# Patient Record
Sex: FEMALE | Race: WHITE
[De-identification: names, ages, dates, MRNs, and addresses within clinical notes are randomized per-mention and may not be internally consistent; named-entity substitution may affect disease eponyms.]

---

## 2016-08-03 VITALS
DIASTOLIC BLOOD PRESSURE: 77 MMHG | SYSTOLIC BLOOD PRESSURE: 129 MMHG | DIASTOLIC BLOOD PRESSURE: 77 MMHG | HEART RATE: 68 BPM | HEART RATE: 68 BPM | SYSTOLIC BLOOD PRESSURE: 129 MMHG

## 2017-08-01 ENCOUNTER — HOSPITAL ENCOUNTER (OUTPATIENT)
Dept: HOSPITAL 45 - C.ONC | Age: 72
Discharge: HOME | End: 2017-08-01
Attending: PHYSICIAN ASSISTANT
Payer: COMMERCIAL

## 2017-08-01 VITALS
SYSTOLIC BLOOD PRESSURE: 129 MMHG | OXYGEN SATURATION: 98 % | TEMPERATURE: 98.6 F | HEART RATE: 87 BPM | DIASTOLIC BLOOD PRESSURE: 81 MMHG

## 2017-08-01 DIAGNOSIS — Z85.3: ICD-10-CM

## 2017-08-01 DIAGNOSIS — Z92.3: ICD-10-CM

## 2017-08-01 DIAGNOSIS — Z08: Primary | ICD-10-CM

## 2017-08-01 NOTE — RADIATION ONCOLOGY FOLLOW-UP
Radiation Oncology Follow-Up


Date of Visit


Aug 1, 2017.





Reason For Visit


Annual follow-up





Radiation Completion Date


1/7/16





Diagnosis





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


Status:  Resolved        Onset Date:  4/16/2015


Histology Subtype:  lobular


Stage:  lll


Permanent Comment:  Status post right breast carcinoma 1993


Status post mastectomy with TRAM flap reconstruction 


Status post systemic chemotherapy 


Antiestrogen therapy for 5 years 


Abnormal left breast mammogram


Status post core biopsy 04/16/2015 revealing invasive lobular carcinoma 


estrogen receptor positive, progesterone receptor negative, HER-2/alo negative


Status post left simple mastectomy and sentinel lymph node biopsy and axillary 

dissection June 29th 2015 placement of tissue expander


Stage [id number] M0


Status post completion of radiation therapy 01/07/2016 received 6120 cGy


Status post exchange of left adjustable implant for gel implant with open 

capsulotomy and She'll ectomy, revision right breast reconstruction with 

elevation of the breast mound, excision of a portion of the flap skin, and 

closure of the defect with local flap.  Excision of bilateral lateral chest 

tissue and closure of resulting defects with local flaps 06/21/2016  Last 

Edited By: Jaylin Cotter on Aug 3, 2016 16:04





History of Present Illness


Ms. Burden is a 71-year-old female without a family history of breast cancer.  

The patient's mother was diagnosed with ovarian cancer at age 66.  Patient had 

a prior history of a right breast carcinoma diagnosed in 1993.  She underwent a 

mastectomy with a TRAM flap reconstruction.  She required systemic chemotherapy 

followed by 5 years of tamoxifen.  She has had no evidence of recurrence in the 

right chest wall.  She has been followed with screening left breast mammograms.

  Her most recent screening mammogram was on 04/10/2015 that was compared to 

prior images from 2014 and 2013.  This showed mild asymmetry at the middle 

depths superiorly with irregular anterior parenchymal margin at 2:00.  The area 

of question persisted on spot and additional views and was nonpalpable.  A 

targeted ultrasound showed a hypoechoic irregular non-parallel indistinct 

shadowing area measuring 0.4 x 0.5 cm smaller than the mammographic 

abnormality.  An ultrasound core biopsy was recommended.  A prominent axillary 

lymph node was noted but he has been unchanged for years.





On 04/16/2015 patient underwent an ultrasound-guided biopsy of the hypoechoic 

lesion noted within the left breast at the 2 o'clock position 3 cm in the 

nipple.  This measured 0.5 x 0.5 x 0.6 cm.  This tissue identified an invasive 

carcinoma, lobular type, grade 2.  Estrogen receptors were strongly positive (

100% nuclear positivity) progesterone receptor were negative (less than 1% 

nuclear positivity.  HER-2/alo oncogene expression is negative.  Accession #: [id number] 

[id number].





The patient subsequently met with Dr. Radu Mtz to discuss possible 

mastectomy and reconstruction given her history of a right breast carcinoma and 

TRAM flap reconstruction.  They've talked about a left mastectomy with 

immediate placement of tissue expander..  Patient was also seen by Dr. Anabel Alvarado and the patient was scheduled for the left breast mastectomy with 

immediate reconstruction.  This was performed on 06/29/2015.  This revealed an 

infiltrating lobular carcinoma grade 1 of 3.  Tumor however measured 3.0 x 2.5 

x 1.5 cm with evidence of LCIS.  The margins were negative.  Tumor was located 

2 cm from the deep margin.  No lymphovascular or perineural invasion was seen.  

14 lymph nodes were taken 7 of which contained metastatic disease.  3 nodes 

contained a macro metastasis and 4 nodes contained micrometastasis.  The size 

of the largest metastatic deposit was a 2 cm.  No extranodal extension was 

identified.  Case: [id number].





The patient is recovering well from surgery.  She has the tissue expander in 

place.  They have not yet started the expansion.  She is scheduled to see Dr. Sandoval for discussion of the role of adjuvant chemotherapy.  This will likely be 

recommended.  We were asked to see her in referral for evaluation and 

discussion of the role of adjuvant radiation. 





She underwent chemotherapy with Taxotere and cyclophosphamide.  She received 4 

cycles of treatment.  She completed the chemotherapy 08/19/2015.  She did not 

require any reductions in treatment or delay of treatment.  She then returned 

to our office to undergo radiation therapy.


She completed radiation therapy 01/07/2016.  She received 6120 cGy.





Interim History


She's been doing well over this past year she does have mild joint discomfort 

associated with the tamoxifen.  This is improved compared to the discomfort she 

felt when on the aromatase inhibitor.  She has noted no changes to the 

reconstructed breast.  There are no masses or tenderness and no axillary 

adenopathy.  She has not had any difficulty with reaccumulation of the seromas 

that had recurred over last summer.





Allergies


Coded Allergies:  


     Cephalexin (Verified  Allergy, Mild, Rash , 8/1/17)





Home Medications


Scheduled


Fluticasone Propionate (Nasal) (Flonase), 1-2 SPRAYS INTNAS QAM


Gabapentin (Neurontin), 300 MG PO TID


Lisinopril/Hctz (Zestoretic 20MG/25MG), 1 TAB PO QAM


Metformin Hcl (Glucophage), 500 MG PO BID


Omeprazole (Prilosec), 20 MG PO QAM


Tamoxifen (Nolvadex), 20 MG PO DAILY





Scheduled PRN


Loratadine (Claritin), 10 MG PO QAM PRN for PRN


[Genteal Eye Drops], 1 DROP OPB DAILY PRN for PRN





Review of Systems


Gastrointestinal:  


   Symptoms:  WNL


Oral:  


   Symptoms:  No Problems


Respiratory:  


   Symptoms:  WNL


Urinary:  


   Symptoms:  WNL


   Comments:  nocturia times1


Skin:  


   Symptoms:  No Problems


Breast:  


   Right Upper Arm Measurement:  33.5


   Right Mid Arm Measurement:  26.7


   Right Wrist Measurement:  16.2


   Left Upper Arm Measurement:  36.5


   Left Mid Arm Measurement:  26.8


   Left Wrist Measurement:  15.7


   Arm Dominence:  Right


   Patient Cosmetic Evaluation:  Excellent


   Staff Cosmetic Evalaluation:  Excellent





Physical Exam





Vital Signs








  Date Time  Temp Pulse Resp B/P (MAP) Pulse Ox O2 Delivery O2 Flow Rate FiO2


 


8/1/17 13:56 37.0 87 18 129/81 98   








Pain:  


   Side:  Bilateral


   Patient Pain Scale:  0 - 10


   Initial Pain Intensity:  0.0


Fatigue:  None


General Appearance:  no apparent distress


Eyes:  normal inspection, EOMI


ENT:  normal ENT inspection, hearing grossly normal


Neck:  no adenopathy, thyroid normal


Respiratory/Chest:  lungs clear, no respiratory distress, no accessory muscle 

use


Breast:


Breast examination reveals bilateral TRAM flap reconstructions.  There are no 

masses or tenderness and no axillary adenopathy.  She has no skin retractions.  

There is no hyperpigmentation.  Using the Gormania score cosmesis she has a good 

outcome.


Cardiovascular:  regular rate, rhythm, no gallop, no murmur


Abdomen:  non tender, soft, no organomegaly


Extremities:  no pedal edema


Neurologic/Psychiatric:  no motor/sensory deficits, alert, normal mood/affect


Skin:  warm/dry





Assessment & Plan


Plan: Continuing her follow-up with her primary care provider and medical 

oncology.  She continues on tamoxifen.  She'll return to our office in 1 year.  

We asked her to call our office if she has any questions or concerns.





Total Time In Follow-Up


I spent 20 minutes speaking to the patient and performing examination.  I spent 

15 minutes reviewing information and completing note.





Copy To


Mj Garrett MD; Levy Sandoval M.D.





Problem Qualifiers





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  


Estrogen receptor status:  positive  Laterality:  left  Qualified Codes:  

C50.412 - Malignant neoplasm of upper-outer quadrant of left female breast; 

Z17.0 - Estrogen receptor positive status [ER+]

## 2018-07-31 ENCOUNTER — HOSPITAL ENCOUNTER (OUTPATIENT)
Dept: HOSPITAL 45 - C.ONC | Age: 73
Discharge: HOME | End: 2018-07-31
Attending: PHYSICIAN ASSISTANT
Payer: COMMERCIAL

## 2018-07-31 VITALS
TEMPERATURE: 98.06 F | SYSTOLIC BLOOD PRESSURE: 166 MMHG | DIASTOLIC BLOOD PRESSURE: 91 MMHG | HEART RATE: 69 BPM | OXYGEN SATURATION: 95 %

## 2018-07-31 DIAGNOSIS — Z08: Primary | ICD-10-CM

## 2018-07-31 DIAGNOSIS — Z85.3: ICD-10-CM

## 2018-07-31 DIAGNOSIS — Z92.3: ICD-10-CM

## 2018-07-31 NOTE — RADIATION ONCOLOGY FOLLOW-UP
Radiation Oncology Follow-Up


Date of Visit


Jul 31, 2018.





Reason For Visit


Annual follow-up





Radiation Completion Date


1/7/16





Diagnosis





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


Status:  Resolved        Onset Date:  4/16/2015


Stage:  lll


Permanent Comment:  Status post right breast carcinoma 1993


Status post mastectomy with TRAM flap reconstruction 


Status post systemic chemotherapy 


Antiestrogen therapy for 5 years 


Abnormal left breast mammogram


Status post core biopsy 04/16/2015 revealing invasive lobular carcinoma 


estrogen receptor positive, progesterone receptor negative, HER-2/alo negative


Status post left simple mastectomy and sentinel lymph node biopsy and axillary 

dissection June 29th 2015 placement of tissue expander


Stage [id number] M0


Status post completion of radiation therapy 01/07/2016 received 6120 cGy


Status post exchange of left adjustable implant for gel implant with open 

capsulotomy and , revision right breast reconstruction with elevation of the 

breast mound, excision of a portion of the flap skin, and closure of the defect 

with local flap.  Excision of bilateral lateral chest tissue and closure of 

resulting defects with local flaps 06/21/2016  Last Edited By: Jaylin Cotter 

on Jul 31, 2018 14:01





History of Present Illness


Ms. Burden is without a family history of breast cancer.  The patient's mother 

was diagnosed with ovarian cancer at age 66.  Patient had a prior history of a 

right breast carcinoma diagnosed in 1993.  She underwent a mastectomy with a 

TRAM flap reconstruction.  She required systemic chemotherapy followed by 5 

years of tamoxifen.  She has had no evidence of recurrence in the right chest 

wall.  She has been followed with screening left breast mammograms.  Her most 

recent screening mammogram was on 04/10/2015 that was compared to prior images 

from 2014 and 2013.  This showed mild asymmetry at the middle depths superiorly 

with irregular anterior parenchymal margin at 2:00.  The area of question 

persisted on spot and additional views and was nonpalpable.  A targeted 

ultrasound showed a hypoechoic irregular non-parallel indistinct shadowing area 

measuring 0.4 x 0.5 cm smaller than the mammographic abnormality.  An 

ultrasound core biopsy was recommended.  A prominent axillary lymph node was 

noted but he has been unchanged for years.





On 04/16/2015 patient underwent an ultrasound-guided biopsy of the hypoechoic 

lesion noted within the left breast at the 2 o'clock position 3 cm in the 

nipple.  This measured 0.5 x 0.5 x 0.6 cm.  This tissue identified an invasive 

carcinoma, lobular type, grade 2.  Estrogen receptors were strongly positive (

100% nuclear positivity) progesterone receptor were negative (less than 1% 

nuclear positivity.  HER-2/alo oncogene expression is negative.  Accession #: [id number] 

[id number].





The patient subsequently met with Dr. Radu Mtz to discuss possible 

mastectomy and reconstruction given her history of a right breast carcinoma and 

TRAM flap reconstruction.  They've talked about a left mastectomy with 

immediate placement of tissue expander..  Patient was also seen by Dr. Anabel Alvarado and the patient was scheduled for the left breast mastectomy with 

immediate reconstruction.  This was performed on 06/29/2015.  This revealed an 

infiltrating lobular carcinoma grade 1 of 3.  Tumor however measured 3.0 x 2.5 

x 1.5 cm with evidence of LCIS.  The margins were negative.  Tumor was located 

2 cm from the deep margin.  No lymphovascular or perineural invasion was seen.  

14 lymph nodes were taken 7 of which contained metastatic disease.  3 nodes 

contained a macro metastasis and 4 nodes contained micrometastasis.  The size 

of the largest metastatic deposit was a 2 cm.  No extranodal extension was 

identified.  Case: [id number]/02/[id number].





The patient is recovering well from surgery.  She has the tissue expander in 

place.  They have not yet started the expansion.  She is scheduled to see Dr. Sandoval for discussion of the role of adjuvant chemotherapy.  This will likely be 

recommended.  We were asked to see her in referral for evaluation and 

discussion of the role of adjuvant radiation. 





She underwent chemotherapy with Taxotere and cyclophosphamide.  She received 4 

cycles of treatment.  She completed the chemotherapy 08/19/2015.  She did not 

require any reductions in treatment or delay of treatment.  She then returned 

to our office to undergo radiation therapy.


She completed radiation therapy 01/07/2016.  She received 6120 cGy.





Interim History


She is noted no changes to her reconstructed breasts.  She is noted no masses 

or tenderness and no change of the axilla.  She has had no swelling of her arm.

  She was seen in medical oncology and a node was palpated in the left 

supraclavicular area.  She was sent for an ultrasound.  That was performed on 

May 9, 2018.  This revealed a 5 mm in diameter morphologically benign-appearing 

lymph node in the left supraclavicular region.  She has noticed no changes in 

this area.  She continues on tamoxifen.  She denies side effects.  She 

previously had hot flashes and these have resolved.





Allergies


Coded Allergies:  


     Cephalexin (Verified  Allergy, Mild, Rash , 8/1/17)





Home Medications


Scheduled


Fluticasone Propionate (Nasal) (Flonase), 1-2 SPRAYS INTNAS QAM


Gabapentin (Neurontin), 300 MG PO TID


Lisinopril/Hctz (Zestoretic 20MG/25MG), 1 TAB PO QAM


Metformin Hcl (Glucophage), 500 MG PO BID


Omeprazole (Prilosec), 20 MG PO QAM


Tamoxifen (Nolvadex), 20 MG PO DAILY





Scheduled PRN


Carboxymethylcellulose Sodium (Theratears), 1 DROP OPB DAILY PRN for DRYNESS


Loratadine (Claritin), 10 MG PO QAM PRN for PRN





Review of Systems


Gastrointestinal:  


   Symptoms:  WNL


Oral:  


   Symptoms:  No Problems


Respiratory:  


   Symptoms:  WNL


Urinary:  


   Symptoms:  WNL


   Comments:  nocturia times1


Skin:  


   Symptoms:  No Problems


Breast:  


   Right Upper Arm Measurement:  35.0


   Right Mid Arm Measurement:  26.0


   Right Wrist Measurement:  16.0


   Left Upper Arm Measurement:  36.0


   Left Mid Arm Measurement:  25.8


   Left Wrist Measurement:  16.0


   Arm Dominence:  Right


   Patient Cosmetic Evaluation:  Good


   Staff Cosmetic Evalaluation:  Excellent





Physical Exam





Vital Signs








  Date Time  Temp Pulse Resp B/P (MAP) Pulse Ox O2 Delivery O2 Flow Rate FiO2


 


7/31/18 13:27 36.7 69 16 166/91 95   








Fatigue:  None


General Appearance:  no apparent distress


Eyes:  normal inspection, EOMI


ENT:  normal ENT inspection, hearing grossly normal


Neck:  thyroid normal, + pertinent finding (Very subtle changes of the left 

supraclavicular area.)


Respiratory/Chest:  lungs clear


Cardiovascular:  regular rate, rhythm, no gallop, no murmur


Abdomen:  non tender, soft


Extremities:  no pedal edema


Neurologic/Psychiatric:  no motor/sensory deficits, alert, normal mood/affect


Skin:  warm/dry





Pain Management


Patient Reports Pain:  No


Side:  Bilateral


Pain Location:  None


Patient Preferred Pain Scale:  0 - 10


Initial Pain Intensity:  0.0


Pain Management Plan


She denies pain therefore requires no pain management.





Laboratory


Laboratory Results:  not applicable





Pathology


Pathology Results:  were reviewed, and pertinent findings noted in HPI





Imaging


Imaging Studies:  were reviewed


Imaging Comments


Reviewed in the interim history.





Assessment & Plan


Plan: Continue regular follow-up with medical oncology.  She continues on 

tamoxifen.  She will be seen again in medical oncology in October and the 

supraclavicular area will be rechecked.  She will continue regular follow-up 

with her primary care provider.  We asked her to return to our office in 1 

year.  She may call if she has any questions or concerns in the interim.





Total Time


In Follow-Up


I spent 20 minutes speaking to the patient in performing examination.  I spent 

15 minutes reviewing information and completing this note.  AK





Copy To


Mj Garrett MD; Levy Sandoval M.D.





Problem Qualifiers





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  


Estrogen receptor status:  positive  Laterality:  left  Qualified Codes:  

C50.412 - Malignant neoplasm of upper-outer quadrant of left female breast; 

Z17.0 - Estrogen receptor positive status [ER+]